# Patient Record
Sex: MALE | URBAN - METROPOLITAN AREA
[De-identification: names, ages, dates, MRNs, and addresses within clinical notes are randomized per-mention and may not be internally consistent; named-entity substitution may affect disease eponyms.]

---

## 2018-05-09 ENCOUNTER — APPOINTMENT (RX ONLY)
Dept: URBAN - METROPOLITAN AREA CLINIC 139 | Facility: CLINIC | Age: 68
Setting detail: DERMATOLOGY
End: 2018-05-09

## 2018-05-09 DIAGNOSIS — L82.1 OTHER SEBORRHEIC KERATOSIS: ICD-10-CM

## 2018-05-09 DIAGNOSIS — D18.0 HEMANGIOMA: ICD-10-CM

## 2018-05-09 DIAGNOSIS — D22 MELANOCYTIC NEVI: ICD-10-CM

## 2018-05-09 PROBLEM — D22.4 MELANOCYTIC NEVI OF SCALP AND NECK: Status: ACTIVE | Noted: 2018-05-09

## 2018-05-09 PROBLEM — D18.01 HEMANGIOMA OF SKIN AND SUBCUTANEOUS TISSUE: Status: ACTIVE | Noted: 2018-05-09

## 2018-05-09 PROBLEM — I10 ESSENTIAL (PRIMARY) HYPERTENSION: Status: ACTIVE | Noted: 2018-05-09

## 2018-05-09 PROCEDURE — ? COUNSELING

## 2018-05-09 PROCEDURE — 99202 OFFICE O/P NEW SF 15 MIN: CPT

## 2018-05-09 ASSESSMENT — LOCATION SIMPLE DESCRIPTION DERM
LOCATION SIMPLE: POSTERIOR SCALP
LOCATION SIMPLE: LEFT CHEEK
LOCATION SIMPLE: ABDOMEN
LOCATION SIMPLE: RIGHT TEMPLE

## 2018-05-09 ASSESSMENT — LOCATION ZONE DERM
LOCATION ZONE: TRUNK
LOCATION ZONE: FACE
LOCATION ZONE: SCALP

## 2018-05-09 ASSESSMENT — LOCATION DETAILED DESCRIPTION DERM
LOCATION DETAILED: RIGHT INFERIOR POSTAURICULAR SKIN
LOCATION DETAILED: LEFT CENTRAL MALAR CHEEK
LOCATION DETAILED: RIGHT INFERIOR TEMPLE
LOCATION DETAILED: EPIGASTRIC SKIN

## 2018-05-09 ASSESSMENT — PAIN INTENSITY VAS: HOW INTENSE IS YOUR PAIN 0 BEING NO PAIN, 10 BEING THE MOST SEVERE PAIN POSSIBLE?: NO PAIN

## 2020-12-21 ENCOUNTER — APPOINTMENT (RX ONLY)
Dept: URBAN - METROPOLITAN AREA CLINIC 139 | Facility: CLINIC | Age: 70
Setting detail: DERMATOLOGY
End: 2020-12-21

## 2020-12-21 DIAGNOSIS — D18.0 HEMANGIOMA: ICD-10-CM

## 2020-12-21 DIAGNOSIS — L82.0 INFLAMED SEBORRHEIC KERATOSIS: ICD-10-CM

## 2020-12-21 DIAGNOSIS — L57.0 ACTINIC KERATOSIS: ICD-10-CM

## 2020-12-21 DIAGNOSIS — L82.1 OTHER SEBORRHEIC KERATOSIS: ICD-10-CM

## 2020-12-21 PROBLEM — D18.01 HEMANGIOMA OF SKIN AND SUBCUTANEOUS TISSUE: Status: ACTIVE | Noted: 2020-12-21

## 2020-12-21 PROCEDURE — 99213 OFFICE O/P EST LOW 20 MIN: CPT | Mod: 25

## 2020-12-21 PROCEDURE — ? COUNSELING

## 2020-12-21 PROCEDURE — 17110 DESTRUCTION B9 LES UP TO 14: CPT

## 2020-12-21 PROCEDURE — ? LIQUID NITROGEN

## 2020-12-21 PROCEDURE — 17000 DESTRUCT PREMALG LESION: CPT | Mod: 59

## 2020-12-21 ASSESSMENT — LOCATION DETAILED DESCRIPTION DERM
LOCATION DETAILED: LEFT SUPERIOR LATERAL MIDBACK
LOCATION DETAILED: EPIGASTRIC SKIN
LOCATION DETAILED: RIGHT CENTRAL ZYGOMA
LOCATION DETAILED: RIGHT FOREHEAD
LOCATION DETAILED: RIGHT CENTRAL TEMPLE
LOCATION DETAILED: INFERIOR THORACIC SPINE
LOCATION DETAILED: LEFT SUPERIOR MEDIAL BUCCAL CHEEK

## 2020-12-21 ASSESSMENT — LOCATION SIMPLE DESCRIPTION DERM
LOCATION SIMPLE: LEFT LOWER BACK
LOCATION SIMPLE: RIGHT ZYGOMA
LOCATION SIMPLE: ABDOMEN
LOCATION SIMPLE: RIGHT FOREHEAD
LOCATION SIMPLE: UPPER BACK
LOCATION SIMPLE: RIGHT TEMPLE
LOCATION SIMPLE: LEFT CHEEK

## 2020-12-21 ASSESSMENT — LOCATION ZONE DERM
LOCATION ZONE: FACE
LOCATION ZONE: TRUNK

## 2020-12-21 ASSESSMENT — PAIN INTENSITY VAS: HOW INTENSE IS YOUR PAIN 0 BEING NO PAIN, 10 BEING THE MOST SEVERE PAIN POSSIBLE?: NO PAIN

## 2020-12-21 NOTE — PROCEDURE: LIQUID NITROGEN
Total Number Of Lesions Treated: 6
Include Z78.9 (Other Specified Conditions Influencing Health Status) As An Associated Diagnosis?: No
Render Post Care In The Note?: yes
Detail Level: Simple
Medical Necessity Information: It is in your best interest to select a reason for this procedure from the list below. All of these items fulfill various CMS LCD requirements except the new and changing color options.
Duration Of Freeze Thaw-Cycle (Seconds): 0
Number Of Freeze-Thaw Cycles: 1 freeze-thaw cycle
Detail Level: Detailed
Consent: The patient's consent was obtained including but not limited to risks of crusting, scabbing, blistering, scarring, darker or lighter pigmentary change, recurrence, incomplete removal and infection.
Post-Care Instructions: I reviewed with the patient in detail post-care instructions. Patient is to wear sunprotection, and avoid picking at any of the treated lesions. Pt may apply Vaseline to crusted or scabbing areas.